# Patient Record
Sex: FEMALE | Race: WHITE | NOT HISPANIC OR LATINO
[De-identification: names, ages, dates, MRNs, and addresses within clinical notes are randomized per-mention and may not be internally consistent; named-entity substitution may affect disease eponyms.]

---

## 2021-06-14 ENCOUNTER — APPOINTMENT (OUTPATIENT)
Dept: OBGYN | Facility: CLINIC | Age: 66
End: 2021-06-14
Payer: MEDICARE

## 2021-06-14 VITALS
BODY MASS INDEX: 28.35 KG/M2 | TEMPERATURE: 97.3 F | DIASTOLIC BLOOD PRESSURE: 80 MMHG | WEIGHT: 160 LBS | SYSTOLIC BLOOD PRESSURE: 132 MMHG | HEIGHT: 63 IN

## 2021-06-14 DIAGNOSIS — U07.1 COVID-19: ICD-10-CM

## 2021-06-14 PROBLEM — Z00.00 ENCOUNTER FOR PREVENTIVE HEALTH EXAMINATION: Status: ACTIVE | Noted: 2021-06-14

## 2021-06-14 PROCEDURE — 99213 OFFICE O/P EST LOW 20 MIN: CPT

## 2021-06-16 LAB — HPV HIGH+LOW RISK DNA PNL CVX: NOT DETECTED

## 2021-06-23 LAB — CYTOLOGY CVX/VAG DOC THIN PREP: NORMAL

## 2022-06-20 ENCOUNTER — APPOINTMENT (OUTPATIENT)
Dept: OBGYN | Facility: CLINIC | Age: 67
End: 2022-06-20
Payer: MEDICARE

## 2022-06-20 ENCOUNTER — NON-APPOINTMENT (OUTPATIENT)
Age: 67
End: 2022-06-20

## 2022-06-20 VITALS
HEART RATE: 77 BPM | SYSTOLIC BLOOD PRESSURE: 144 MMHG | HEIGHT: 63 IN | DIASTOLIC BLOOD PRESSURE: 86 MMHG | BODY MASS INDEX: 28.35 KG/M2 | TEMPERATURE: 97.5 F | WEIGHT: 160 LBS

## 2022-06-20 LAB
BILIRUB UR QL STRIP: NEGATIVE
CLARITY UR: CLEAR
COLLECTION METHOD: NORMAL
GLUCOSE UR-MCNC: NEGATIVE
HCG UR QL: 0.2 EU/DL
HGB UR QL STRIP.AUTO: NORMAL
KETONES UR-MCNC: NEGATIVE
LEUKOCYTE ESTERASE UR QL STRIP: NORMAL
NITRITE UR QL STRIP: NEGATIVE
PH UR STRIP: 6
PROT UR STRIP-MCNC: NEGATIVE
SP GR UR STRIP: 1.01

## 2022-06-20 PROCEDURE — 81003 URINALYSIS AUTO W/O SCOPE: CPT | Mod: QW

## 2022-06-20 PROCEDURE — G0101: CPT

## 2022-06-26 LAB — HPV HIGH+LOW RISK DNA PNL CVX: NOT DETECTED

## 2022-06-29 LAB — CYTOLOGY CVX/VAG DOC THIN PREP: ABNORMAL

## 2023-06-26 ENCOUNTER — APPOINTMENT (OUTPATIENT)
Dept: OBGYN | Facility: CLINIC | Age: 68
End: 2023-06-26
Payer: MEDICARE

## 2023-06-26 VITALS — HEART RATE: 71 BPM | DIASTOLIC BLOOD PRESSURE: 86 MMHG | SYSTOLIC BLOOD PRESSURE: 147 MMHG | HEIGHT: 63 IN

## 2023-06-26 DIAGNOSIS — R39.89 OTHER SYMPTOMS AND SIGNS INVOLVING THE GENITOURINARY SYSTEM: ICD-10-CM

## 2023-06-26 LAB
BILIRUB UR QL STRIP: NEGATIVE
CLARITY UR: CLEAR
COLLECTION METHOD: NORMAL
GLUCOSE UR-MCNC: NEGATIVE
HCG UR QL: 0.2 EU/DL
HGB UR QL STRIP.AUTO: NORMAL
KETONES UR-MCNC: NEGATIVE
LEUKOCYTE ESTERASE UR QL STRIP: NORMAL
NITRITE UR QL STRIP: NEGATIVE
PH UR STRIP: 6.5
PROT UR STRIP-MCNC: NORMAL
SP GR UR STRIP: 1.01

## 2023-06-26 PROCEDURE — 99213 OFFICE O/P EST LOW 20 MIN: CPT

## 2023-06-26 PROCEDURE — 81003 URINALYSIS AUTO W/O SCOPE: CPT | Mod: QW

## 2023-07-02 LAB
BACTERIA UR CULT: NORMAL
CYTOLOGY CVX/VAG DOC THIN PREP: NORMAL
HPV HIGH+LOW RISK DNA PNL CVX: NOT DETECTED

## 2024-06-24 ENCOUNTER — APPOINTMENT (OUTPATIENT)
Dept: OBGYN | Facility: CLINIC | Age: 69
End: 2024-06-24
Payer: MEDICARE

## 2024-06-24 VITALS
BODY MASS INDEX: 29.23 KG/M2 | DIASTOLIC BLOOD PRESSURE: 80 MMHG | HEIGHT: 63 IN | HEART RATE: 67 BPM | SYSTOLIC BLOOD PRESSURE: 143 MMHG | WEIGHT: 165 LBS

## 2024-06-24 DIAGNOSIS — Z01.419 ENCOUNTER FOR GYNECOLOGICAL EXAMINATION (GENERAL) (ROUTINE) W/OUT ABNORMAL FINDINGS: ICD-10-CM

## 2024-06-24 DIAGNOSIS — R92.30 DENSE BREASTS, UNSPECIFIED: ICD-10-CM

## 2024-06-24 DIAGNOSIS — R31.29 OTHER MICROSCOPIC HEMATURIA: ICD-10-CM

## 2024-06-24 DIAGNOSIS — R87.610 ATYPICAL SQUAMOUS CELLS OF UNDETERMINED SIGNIFICANCE ON CYTOLOGIC SMEAR OF CERVIX (ASC-US): ICD-10-CM

## 2024-06-24 LAB
BILIRUB UR QL STRIP: NORMAL
CLARITY UR: CLEAR
COLLECTION METHOD: NORMAL
GLUCOSE UR-MCNC: NORMAL
HCG UR QL: 0.2 EU/DL
HGB UR QL STRIP.AUTO: ABNORMAL
KETONES UR-MCNC: NORMAL
LEUKOCYTE ESTERASE UR QL STRIP: NORMAL
NITRITE UR QL STRIP: NORMAL
PH UR STRIP: 6
PROT UR STRIP-MCNC: NORMAL
SP GR UR STRIP: 1.02

## 2024-06-24 PROCEDURE — 81003 URINALYSIS AUTO W/O SCOPE: CPT | Mod: QW

## 2024-06-24 PROCEDURE — G0101: CPT

## 2024-07-12 ENCOUNTER — APPOINTMENT (OUTPATIENT)
Dept: UROLOGY | Facility: CLINIC | Age: 69
End: 2024-07-12
Payer: MEDICARE

## 2024-07-12 DIAGNOSIS — R87.610 ATYPICAL SQUAMOUS CELLS OF UNDETERMINED SIGNIFICANCE ON CYTOLOGIC SMEAR OF CERVIX (ASC-US): ICD-10-CM

## 2024-07-12 DIAGNOSIS — Z80.9 FAMILY HISTORY OF MALIGNANT NEOPLASM, UNSPECIFIED: ICD-10-CM

## 2024-07-12 DIAGNOSIS — Z83.6 FAMILY HISTORY OF OTHER DISEASES OF THE RESPIRATORY SYSTEM: ICD-10-CM

## 2024-07-12 DIAGNOSIS — Z87.891 PERSONAL HISTORY OF NICOTINE DEPENDENCE: ICD-10-CM

## 2024-07-12 DIAGNOSIS — R31.29 OTHER MICROSCOPIC HEMATURIA: ICD-10-CM

## 2024-07-12 PROCEDURE — 99203 OFFICE O/P NEW LOW 30 MIN: CPT | Mod: 25

## 2024-07-12 PROCEDURE — 81003 URINALYSIS AUTO W/O SCOPE: CPT | Mod: QW

## 2024-07-14 PROBLEM — Z87.891 FORMER SMOKER: Status: ACTIVE | Noted: 2024-07-12

## 2024-07-14 LAB
BILIRUB UR QL STRIP: NORMAL
COLLECTION METHOD: NORMAL
GLUCOSE UR-MCNC: NORMAL
HCG UR QL: NORMAL EU/DL
KETONES UR-MCNC: NORMAL
LEUKOCYTE ESTERASE UR QL STRIP: NORMAL
NITRITE UR QL STRIP: NORMAL
PH UR STRIP: 6
PROT UR STRIP-MCNC: NORMAL
SP GR UR STRIP: 1.01

## 2024-07-14 RX ORDER — LEVOTHYROXINE SODIUM 0.17 MG/1
175 TABLET ORAL
Refills: 0 | Status: ACTIVE | COMMUNITY

## 2024-07-14 RX ORDER — OMEPRAZOLE 20 MG/1
20 TABLET, ORALLY DISINTEGRATING, DELAYED RELEASE ORAL
Refills: 0 | Status: ACTIVE | COMMUNITY

## 2024-07-14 RX ORDER — TELMISARTAN AND HYDROCHLOROTHIAZIDE 80; 12.5 MG/1; MG/1
80-12.5 TABLET ORAL
Refills: 0 | Status: ACTIVE | COMMUNITY

## 2024-07-31 ENCOUNTER — NON-APPOINTMENT (OUTPATIENT)
Age: 69
End: 2024-07-31

## 2024-08-06 ENCOUNTER — LABORATORY RESULT (OUTPATIENT)
Age: 69
End: 2024-08-06

## 2024-08-06 ENCOUNTER — APPOINTMENT (OUTPATIENT)
Dept: UROLOGY | Facility: CLINIC | Age: 69
End: 2024-08-06

## 2024-08-06 PROBLEM — N20.0 RIGHT RENAL STONE: Status: ACTIVE | Noted: 2024-08-06

## 2024-08-06 PROCEDURE — 81003 URINALYSIS AUTO W/O SCOPE: CPT | Mod: QW

## 2024-08-06 PROCEDURE — 99214 OFFICE O/P EST MOD 30 MIN: CPT | Mod: 25

## 2024-08-06 PROCEDURE — G2211 COMPLEX E/M VISIT ADD ON: CPT

## 2024-08-15 NOTE — ASSESSMENT
[FreeTextEntry1] :  69 year old female patient with microhematuria. We discussed obtaining a CT scan versus repeating ultrasound and KUB in 3 months. Pt would prefer to repeat the ultrasound. We will send her urine for microscopic urinalysis - if this comes back with more RBCs, we will consider a CT scan. Pt seen with JJ Ch. Total time=30 min.   The submitted E/M billing level for this visit reflects the total time spent on the day of the visit including face-to-face time spent with the patient, non-face-to-face review of medical records and relevant information, documentation, and asynchronous communication with the patient after a visit via phone, email, or patients EHR portal after the visit. The medical records reviewed are either scanned into the chart or reviewed with the patient using a patients electronic medical records portal for patients with records not available to [facility] via electronic transmission platforms from other institutions and labs. Time spent counseling and performing coordination of care was also included in determining the appropriate EM billing level.   I have reviewed and verified information regarding the chief complaint and history recorded by the ancillary staff and/or the patient. I have independently reviewed and interpreted tests performed by other physicians and facilities as necessary.   I have discussed with the patient differential diagnosis, reason for auxiliary tests if ordered, risks, benefits, alternatives, and complications of each form of therapy were discussed.  I personally performed the services described in the documentation, reviewed the documentation recorded by the scribe in my presence, and it accurately and completely records my words and actions.

## 2024-08-15 NOTE — HISTORY OF PRESENT ILLNESS
[FreeTextEntry1] :  69 year old female patient seen in follow-up for microhematuria. Her urinalysis in office today showed some blood and was sent for micro analysis to confirm. Her ultrasound was reviewed and results are as listed below. She complains of right sided back pain, only with movement. This pain is not consistent with renal colic. Denies any voiding issues, gross hematuria, or dysuria. Pt comes in today to discuss her ultrasound results.    Renal and bladder Ultrasound taken on 07/31/24:  4 mm stone within right kidney Mild dilation of both renal collecting system suggesting possible mild bilateral hydronephrosis, of uncertain etiology. Consider follow-up CT.  Small bilateral renal cysts.  BLADDER: Prevoid voume 343 cc, post void 19 cc. No bladder wall thickening. Left ureteral jet was visualized. Right ureteral jet not visualized.

## 2024-08-15 NOTE — ADDENDUM
[FreeTextEntry1] :  JUAN PABLO ALAMO, am scribing for and in the presence of  in the following sections: HISTORY OF PRESENT ILLNESS, PAST MEDICAL/FAMILY/SOCIAL HISTORY, REVIEW OF SYSTEMS, VITAL SIGNS, PHYSICAL EXAM, ASSESSMENT/PLAN on 08/06/2024.

## 2024-08-15 NOTE — ASSESSMENT
[FreeTextEntry1] :  69 year old female patient with microhematuria. We discussed obtaining a CT scan versus repeating ultrasound and KUB in 3 months. Pt would prefer to repeat the ultrasound. We will send her urine for microscopic urinalysis - if this comes back with more RBCs, we will consider a CT scan. Pt seen with JJ Ch. Total time=30 min.   The submitted E/M billing level for this visit reflects the total time spent on the day of the visit including face-to-face time spent with the patient, non-face-to-face review of medical records and relevant information, documentation, and asynchronous communication with the patient after a visit via phone, email, or patients EHR portal after the visit. The medical records reviewed are either scanned into the chart or reviewed with the patient using a patients electronic medical records portal for patients with records not available to [facility] via electronic transmission platforms from other institutions and labs. Time spent counseling and performing coordination of care was also included in determining the appropriate EM billing level.   I have reviewed and verified information regarding the chief complaint and history recorded by the ancillary staff and/or the patient. I have independently reviewed and interpreted tests performed by other physicians and facilities as necessary.   I have discussed with the patient differential diagnosis, reason for auxiliary tests if ordered, risks, benefits, alternatives, and complications of each form of therapy were discussed.  I personally performed the services described in the documentation, reviewed the documentation recorded by the scribe in my presence, and it accurately and completely records my words and actions.

## 2024-10-30 ENCOUNTER — NON-APPOINTMENT (OUTPATIENT)
Age: 69
End: 2024-10-30

## 2024-10-31 ENCOUNTER — NON-APPOINTMENT (OUTPATIENT)
Age: 69
End: 2024-10-31

## 2024-11-05 ENCOUNTER — APPOINTMENT (OUTPATIENT)
Dept: UROLOGY | Facility: CLINIC | Age: 69
End: 2024-11-05
Payer: MEDICARE

## 2024-11-05 ENCOUNTER — NON-APPOINTMENT (OUTPATIENT)
Age: 69
End: 2024-11-05

## 2024-11-05 VITALS
TEMPERATURE: 97.4 F | RESPIRATION RATE: 17 BRPM | WEIGHT: 165 LBS | HEART RATE: 66 BPM | BODY MASS INDEX: 29.23 KG/M2 | HEIGHT: 63 IN | DIASTOLIC BLOOD PRESSURE: 85 MMHG | SYSTOLIC BLOOD PRESSURE: 145 MMHG | OXYGEN SATURATION: 97 %

## 2024-11-05 DIAGNOSIS — R31.29 OTHER MICROSCOPIC HEMATURIA: ICD-10-CM

## 2024-11-05 DIAGNOSIS — N20.0 CALCULUS OF KIDNEY: ICD-10-CM

## 2024-11-05 PROCEDURE — 81003 URINALYSIS AUTO W/O SCOPE: CPT | Mod: QW

## 2024-11-05 PROCEDURE — 99214 OFFICE O/P EST MOD 30 MIN: CPT | Mod: 25

## 2024-11-05 PROCEDURE — G2211 COMPLEX E/M VISIT ADD ON: CPT

## 2024-11-10 LAB
BILIRUB UR QL STRIP: NORMAL
CLARITY UR: CLEAR
COLLECTION METHOD: NORMAL
GLUCOSE UR-MCNC: NORMAL
HCG UR QL: 0.2 EU/DL
HGB UR QL STRIP.AUTO: NORMAL
KETONES UR-MCNC: NORMAL
LEUKOCYTE ESTERASE UR QL STRIP: NORMAL
NITRITE UR QL STRIP: NORMAL
PH UR STRIP: 5.5
PROT UR STRIP-MCNC: NORMAL
SP GR UR STRIP: 1.01

## 2024-12-02 DIAGNOSIS — N64.89 OTHER SPECIFIED DISORDERS OF BREAST: ICD-10-CM

## 2024-12-09 ENCOUNTER — NON-APPOINTMENT (OUTPATIENT)
Age: 69
End: 2024-12-09

## 2024-12-09 DIAGNOSIS — N63.20 UNSPECIFIED LUMP IN THE LEFT BREAST, UNSPECIFIED QUADRANT: ICD-10-CM

## 2024-12-17 ENCOUNTER — NON-APPOINTMENT (OUTPATIENT)
Age: 69
End: 2024-12-17

## 2024-12-18 ENCOUNTER — APPOINTMENT (OUTPATIENT)
Dept: BREAST CENTER | Facility: CLINIC | Age: 69
End: 2024-12-18
Payer: MEDICARE

## 2024-12-18 ENCOUNTER — NON-APPOINTMENT (OUTPATIENT)
Age: 69
End: 2024-12-18

## 2024-12-18 ENCOUNTER — APPOINTMENT (OUTPATIENT)
Dept: BREAST CENTER | Facility: CLINIC | Age: 69
End: 2024-12-18

## 2024-12-18 VITALS
SYSTOLIC BLOOD PRESSURE: 148 MMHG | BODY MASS INDEX: 29.23 KG/M2 | WEIGHT: 165 LBS | DIASTOLIC BLOOD PRESSURE: 100 MMHG | HEART RATE: 72 BPM | HEIGHT: 63 IN

## 2024-12-18 DIAGNOSIS — C50.412 MALIGNANT NEOPLASM OF UPPER-OUTER QUADRANT OF LEFT FEMALE BREAST: ICD-10-CM

## 2024-12-18 PROCEDURE — 99205 OFFICE O/P NEW HI 60 MIN: CPT

## 2024-12-24 ENCOUNTER — NON-APPOINTMENT (OUTPATIENT)
Age: 69
End: 2024-12-24

## 2024-12-24 ENCOUNTER — APPOINTMENT (OUTPATIENT)
Dept: HEMATOLOGY ONCOLOGY | Facility: CLINIC | Age: 69
End: 2024-12-24

## 2025-01-09 ENCOUNTER — NON-APPOINTMENT (OUTPATIENT)
Age: 70
End: 2025-01-09

## 2025-01-14 ENCOUNTER — OUTPATIENT (OUTPATIENT)
Dept: OUTPATIENT SERVICES | Facility: HOSPITAL | Age: 70
LOS: 1 days | End: 2025-01-14
Payer: MEDICARE

## 2025-01-14 ENCOUNTER — RESULT REVIEW (OUTPATIENT)
Age: 70
End: 2025-01-14

## 2025-01-14 DIAGNOSIS — Z85.3 PERSONAL HISTORY OF MALIGNANT NEOPLASM OF BREAST: ICD-10-CM

## 2025-01-14 DIAGNOSIS — C50.412 MALIGNANT NEOPLASM OF UPPER-OUTER QUADRANT OF LEFT FEMALE BREAST: ICD-10-CM

## 2025-01-14 PROCEDURE — 77049 MRI BREAST C-+ W/CAD BI: CPT | Mod: 26

## 2025-01-14 PROCEDURE — C8937: CPT

## 2025-01-14 PROCEDURE — A9579: CPT

## 2025-01-14 PROCEDURE — 77049 MRI BREAST C-+ W/CAD BI: CPT

## 2025-01-15 ENCOUNTER — NON-APPOINTMENT (OUTPATIENT)
Age: 70
End: 2025-01-15

## 2025-01-15 DIAGNOSIS — C50.412 MALIGNANT NEOPLASM OF UPPER-OUTER QUADRANT OF LEFT FEMALE BREAST: ICD-10-CM

## 2025-01-17 ENCOUNTER — LABORATORY RESULT (OUTPATIENT)
Age: 70
End: 2025-01-17

## 2025-01-17 ENCOUNTER — OUTPATIENT (OUTPATIENT)
Dept: OUTPATIENT SERVICES | Facility: HOSPITAL | Age: 70
LOS: 1 days | End: 2025-01-17
Payer: MEDICARE

## 2025-01-17 VITALS
RESPIRATION RATE: 16 BRPM | DIASTOLIC BLOOD PRESSURE: 98 MMHG | HEART RATE: 66 BPM | HEIGHT: 61 IN | OXYGEN SATURATION: 98 % | TEMPERATURE: 98 F | WEIGHT: 160.06 LBS | SYSTOLIC BLOOD PRESSURE: 144 MMHG

## 2025-01-17 DIAGNOSIS — Z01.818 ENCOUNTER FOR OTHER PREPROCEDURAL EXAMINATION: ICD-10-CM

## 2025-01-17 DIAGNOSIS — C50.412 MALIGNANT NEOPLASM OF UPPER-OUTER QUADRANT OF LEFT FEMALE BREAST: ICD-10-CM

## 2025-01-17 DIAGNOSIS — Z98.890 OTHER SPECIFIED POSTPROCEDURAL STATES: Chronic | ICD-10-CM

## 2025-01-17 DIAGNOSIS — I10 ESSENTIAL (PRIMARY) HYPERTENSION: Chronic | ICD-10-CM

## 2025-01-17 LAB
ALBUMIN SERPL ELPH-MCNC: 4.2 G/DL — SIGNIFICANT CHANGE UP (ref 3.5–5.2)
ALP SERPL-CCNC: 97 U/L — SIGNIFICANT CHANGE UP (ref 30–115)
ALT FLD-CCNC: 12 U/L — SIGNIFICANT CHANGE UP (ref 0–41)
ANION GAP SERPL CALC-SCNC: 12 MMOL/L — SIGNIFICANT CHANGE UP (ref 7–14)
AST SERPL-CCNC: 10 U/L — SIGNIFICANT CHANGE UP (ref 0–41)
BASOPHILS # BLD AUTO: 0.08 K/UL — SIGNIFICANT CHANGE UP (ref 0–0.2)
BASOPHILS NFR BLD AUTO: 1.1 % — HIGH (ref 0–1)
BILIRUB SERPL-MCNC: 0.4 MG/DL — SIGNIFICANT CHANGE UP (ref 0.2–1.2)
BUN SERPL-MCNC: 14 MG/DL — SIGNIFICANT CHANGE UP (ref 10–20)
CALCIUM SERPL-MCNC: 9.4 MG/DL — SIGNIFICANT CHANGE UP (ref 8.4–10.5)
CHLORIDE SERPL-SCNC: 105 MMOL/L — SIGNIFICANT CHANGE UP (ref 98–110)
CO2 SERPL-SCNC: 26 MMOL/L — SIGNIFICANT CHANGE UP (ref 17–32)
CREAT SERPL-MCNC: 0.8 MG/DL — SIGNIFICANT CHANGE UP (ref 0.7–1.5)
EGFR: 79 ML/MIN/1.73M2 — SIGNIFICANT CHANGE UP
EOSINOPHIL # BLD AUTO: 0.29 K/UL — SIGNIFICANT CHANGE UP (ref 0–0.7)
EOSINOPHIL NFR BLD AUTO: 3.8 % — SIGNIFICANT CHANGE UP (ref 0–8)
GLUCOSE SERPL-MCNC: 93 MG/DL — SIGNIFICANT CHANGE UP (ref 70–99)
HCT VFR BLD CALC: 42.2 % — SIGNIFICANT CHANGE UP (ref 37–47)
HGB BLD-MCNC: 13.3 G/DL — SIGNIFICANT CHANGE UP (ref 12–16)
IMM GRANULOCYTES NFR BLD AUTO: 0.4 % — HIGH (ref 0.1–0.3)
LYMPHOCYTES # BLD AUTO: 2.31 K/UL — SIGNIFICANT CHANGE UP (ref 1.2–3.4)
LYMPHOCYTES # BLD AUTO: 30.5 % — SIGNIFICANT CHANGE UP (ref 20.5–51.1)
MCHC RBC-ENTMCNC: 28.7 PG — SIGNIFICANT CHANGE UP (ref 27–31)
MCHC RBC-ENTMCNC: 31.5 G/DL — LOW (ref 32–37)
MCV RBC AUTO: 90.9 FL — SIGNIFICANT CHANGE UP (ref 81–99)
MONOCYTES # BLD AUTO: 0.55 K/UL — SIGNIFICANT CHANGE UP (ref 0.1–0.6)
MONOCYTES NFR BLD AUTO: 7.3 % — SIGNIFICANT CHANGE UP (ref 1.7–9.3)
NEUTROPHILS # BLD AUTO: 4.31 K/UL — SIGNIFICANT CHANGE UP (ref 1.4–6.5)
NEUTROPHILS NFR BLD AUTO: 56.9 % — SIGNIFICANT CHANGE UP (ref 42.2–75.2)
NRBC # BLD: 0 /100 WBCS — SIGNIFICANT CHANGE UP (ref 0–0)
PLATELET # BLD AUTO: 278 K/UL — SIGNIFICANT CHANGE UP (ref 130–400)
PMV BLD: 9.9 FL — SIGNIFICANT CHANGE UP (ref 7.4–10.4)
POTASSIUM SERPL-MCNC: 4.2 MMOL/L — SIGNIFICANT CHANGE UP (ref 3.5–5)
POTASSIUM SERPL-SCNC: 4.2 MMOL/L — SIGNIFICANT CHANGE UP (ref 3.5–5)
PROT SERPL-MCNC: 6.4 G/DL — SIGNIFICANT CHANGE UP (ref 6–8)
RBC # BLD: 4.64 M/UL — SIGNIFICANT CHANGE UP (ref 4.2–5.4)
RBC # FLD: 13.4 % — SIGNIFICANT CHANGE UP (ref 11.5–14.5)
SODIUM SERPL-SCNC: 143 MMOL/L — SIGNIFICANT CHANGE UP (ref 135–146)
WBC # BLD: 7.57 K/UL — SIGNIFICANT CHANGE UP (ref 4.8–10.8)
WBC # FLD AUTO: 7.57 K/UL — SIGNIFICANT CHANGE UP (ref 4.8–10.8)

## 2025-01-17 PROCEDURE — 93010 ELECTROCARDIOGRAM REPORT: CPT

## 2025-01-17 PROCEDURE — 99214 OFFICE O/P EST MOD 30 MIN: CPT | Mod: 25

## 2025-01-17 PROCEDURE — 85025 COMPLETE CBC W/AUTO DIFF WBC: CPT

## 2025-01-17 PROCEDURE — 80053 COMPREHEN METABOLIC PANEL: CPT

## 2025-01-17 PROCEDURE — 36415 COLL VENOUS BLD VENIPUNCTURE: CPT

## 2025-01-17 PROCEDURE — 93005 ELECTROCARDIOGRAM TRACING: CPT

## 2025-01-17 NOTE — H&P PST ADULT - HISTORY OF PRESENT ILLNESS
PATIENT CURRENTLY DENIES CHEST PAIN  SHORTNESS OF BREATH  PALPITATIONS,  DYSURIA, OR UPPER RESPIRATORY INFECTION IN PAST 2 WEEKS  PATIENT REPORTS BREAST CA AFTER HER ROUTINE MAMMOGRAM, WORK UP WAS DONE INCLUDING BIOPSY, SURGERY ABOVE SCHEDULLED    Denies travel outside the [place] in the past 30 days  Patient denies any signs or symptoms of COVID 19 and denies contact with known positive individuals.         Anesthesia Alert  YES--Difficult Airway  CLASS IV  NO--History of neck surgery or radiation  NO--Limited ROM of neck  NO--History of Malignant hyperthermia  NO--No personal or family history of Pseudocholinesterase deficiency.  NO--Prior Anesthesia Complication  NO--Latex Allergy  NO--Loose teeth  NO--History of Rheumatoid Arthritis  NO--Bleeding risk  NO--KRISTINE  NO--Other_____    DASI  8.97    RCRI 0    PT DENIES ANY RASHES, ABRASION, OR OPEN WOUNDS OR CUTS    AS PER THE PT, THIS IS HIS/HER COMPLETE MEDICAL AND SURGICAL HX, INCLUDING MEDICATIONS PRESCRIBED AND OVER THE COUNTER    Patient/Guardian understands the instructions and was given the opportunity to ask questions and have them answered.    pt denies any suicidal ideation or thoughts, pt states not a threat to self or others

## 2025-01-17 NOTE — H&P PST ADULT - REASON FOR ADMISSION
Case Type: OP Block TimeSuite: CASProceduralist: Suma Chambers  Confirmed Surgery DateTime: 01- - Procedure: left breast bracketed lumpectomy with left breast bracketed needle localization  ERP?: NoLaterality: N/ALength of Procedure: 60 Minutes  Anesthesia Type: Local Standby Case Type: OP Block TimeSuite: CASProceduralist: Suma Chambers  Confirmed Surgery DateTime: 01- - Procedure: left breast bracketed lumpectomy with left breast bracketed needle localization, tissue transfer  ERP?: NoLaterality: N/ALength of Procedure: 60 Minutes  Anesthesia Type: Local Standby

## 2025-01-17 NOTE — H&P PST ADULT - NSANTHOSAYNRD_GEN_A_CORE
Albumin 3.0  c/w Ensure supplements Albumin 3.0  c/w Ensure supplements Continue synthroid Continue synthroid. Continue synthroid. Continue synthroid. Continue with SQ Lovenox Continue with SQ Lovenox Continue with SQ Lovenox Continue with SQ Lovenox Continue with SQ Lovenox Continue with SQ Lovenox Continue with SQ Lovenox c/w lovenox for DVT c/w lovenox for DVT proph -Patient has hx of dementia, not on any medications. -Patient has hx of dementia, not on any medications. -Patient has hx of dementia, not on any medications. -Patient has hx of dementia, not on any medications. Continue synthroid Continue synthroid. Albumin 3.0  c/w Ensure supplements No. KRISTINE screening performed.  STOP BANG Legend: 0-2 = LOW Risk; 3-4 = INTERMEDIATE Risk; 5-8 = HIGH Risk

## 2025-01-18 DIAGNOSIS — Z01.818 ENCOUNTER FOR OTHER PREPROCEDURAL EXAMINATION: ICD-10-CM

## 2025-01-18 DIAGNOSIS — C50.412 MALIGNANT NEOPLASM OF UPPER-OUTER QUADRANT OF LEFT FEMALE BREAST: ICD-10-CM

## 2025-01-21 ENCOUNTER — OUTPATIENT (OUTPATIENT)
Dept: OUTPATIENT SERVICES | Facility: HOSPITAL | Age: 70
LOS: 1 days | End: 2025-01-21

## 2025-01-21 DIAGNOSIS — Z00.8 ENCOUNTER FOR OTHER GENERAL EXAMINATION: ICD-10-CM

## 2025-01-21 DIAGNOSIS — Z98.890 OTHER SPECIFIED POSTPROCEDURAL STATES: Chronic | ICD-10-CM

## 2025-01-21 PROBLEM — I10 ESSENTIAL (PRIMARY) HYPERTENSION: Chronic | Status: ACTIVE | Noted: 2025-01-17

## 2025-01-21 PROBLEM — E03.9 HYPOTHYROIDISM, UNSPECIFIED: Chronic | Status: ACTIVE | Noted: 2025-01-17

## 2025-01-22 DIAGNOSIS — Z00.8 ENCOUNTER FOR OTHER GENERAL EXAMINATION: ICD-10-CM

## 2025-01-23 ENCOUNTER — OUTPATIENT (OUTPATIENT)
Dept: OUTPATIENT SERVICES | Facility: HOSPITAL | Age: 70
LOS: 1 days | End: 2025-01-23
Payer: MEDICARE

## 2025-01-23 ENCOUNTER — RESULT REVIEW (OUTPATIENT)
Age: 70
End: 2025-01-23

## 2025-01-23 DIAGNOSIS — R92.8 OTHER ABNORMAL AND INCONCLUSIVE FINDINGS ON DIAGNOSTIC IMAGING OF BREAST: ICD-10-CM

## 2025-01-23 DIAGNOSIS — Z98.890 OTHER SPECIFIED POSTPROCEDURAL STATES: Chronic | ICD-10-CM

## 2025-01-23 PROCEDURE — A9579: CPT

## 2025-01-23 PROCEDURE — 77065 DX MAMMO INCL CAD UNI: CPT | Mod: 26,LT

## 2025-01-23 PROCEDURE — A4648: CPT

## 2025-01-23 PROCEDURE — 19288 PERQ DEV BREAST ADD MR GUIDE: CPT | Mod: LT

## 2025-01-23 PROCEDURE — 19287 PERQ DEV BREAST 1ST MR GUIDE: CPT | Mod: LT

## 2025-01-23 PROCEDURE — 77065 DX MAMMO INCL CAD UNI: CPT | Mod: LT

## 2025-01-24 DIAGNOSIS — R92.8 OTHER ABNORMAL AND INCONCLUSIVE FINDINGS ON DIAGNOSTIC IMAGING OF BREAST: ICD-10-CM

## 2025-01-28 ENCOUNTER — APPOINTMENT (OUTPATIENT)
Dept: BREAST CENTER | Facility: AMBULATORY SURGERY CENTER | Age: 70
End: 2025-01-28
Payer: MEDICARE

## 2025-01-28 ENCOUNTER — OUTPATIENT (OUTPATIENT)
Dept: OUTPATIENT SERVICES | Facility: HOSPITAL | Age: 70
LOS: 1 days | Discharge: HOME | End: 2025-01-28
Payer: MEDICARE

## 2025-01-28 ENCOUNTER — RESULT REVIEW (OUTPATIENT)
Age: 70
End: 2025-01-28

## 2025-01-28 VITALS
HEART RATE: 61 BPM | SYSTOLIC BLOOD PRESSURE: 126 MMHG | DIASTOLIC BLOOD PRESSURE: 73 MMHG | OXYGEN SATURATION: 95 % | RESPIRATION RATE: 20 BRPM

## 2025-01-28 VITALS
DIASTOLIC BLOOD PRESSURE: 89 MMHG | HEART RATE: 62 BPM | HEIGHT: 61 IN | RESPIRATION RATE: 17 BRPM | OXYGEN SATURATION: 96 % | TEMPERATURE: 98 F | SYSTOLIC BLOOD PRESSURE: 159 MMHG | WEIGHT: 160.06 LBS

## 2025-01-28 DIAGNOSIS — C50.412 MALIGNANT NEOPLASM OF UPPER-OUTER QUADRANT OF LEFT FEMALE BREAST: ICD-10-CM

## 2025-01-28 DIAGNOSIS — Z98.890 OTHER SPECIFIED POSTPROCEDURAL STATES: Chronic | ICD-10-CM

## 2025-01-28 PROCEDURE — 19301 PARTIAL MASTECTOMY: CPT | Mod: LT

## 2025-01-28 PROCEDURE — 14001 TIS TRNFR TRUNK 10.1-30SQCM: CPT

## 2025-01-28 PROCEDURE — 19281 PERQ DEVICE BREAST 1ST IMAG: CPT | Mod: LT

## 2025-01-28 PROCEDURE — 19282 PERQ DEVICE BREAST EA IMAG: CPT | Mod: LT

## 2025-01-28 PROCEDURE — 88305 TISSUE EXAM BY PATHOLOGIST: CPT | Mod: 26

## 2025-01-28 PROCEDURE — C1889: CPT

## 2025-01-28 PROCEDURE — 88305 TISSUE EXAM BY PATHOLOGIST: CPT

## 2025-01-28 RX ORDER — LEVOTHYROXINE SODIUM 25 UG/1
1 TABLET ORAL
Refills: 0 | DISCHARGE

## 2025-01-28 RX ORDER — HYDROMORPHONE HYDROCHLORIDE 4 MG/ML
0.5 INJECTION, SOLUTION INTRAMUSCULAR; INTRAVENOUS; SUBCUTANEOUS
Refills: 0 | Status: DISCONTINUED | OUTPATIENT
Start: 2025-01-28 | End: 2025-01-28

## 2025-01-28 RX ORDER — TELMISARTAN 40 MG/1
1 TABLET ORAL
Refills: 0 | DISCHARGE

## 2025-01-28 RX ORDER — OXYCODONE HYDROCHLORIDE 30 MG/1
5 TABLET ORAL ONCE
Refills: 0 | Status: DISCONTINUED | OUTPATIENT
Start: 2025-01-28 | End: 2025-01-28

## 2025-01-28 RX ORDER — SODIUM CHLORIDE 9 G/ML
1000 INJECTION, SOLUTION INTRAVENOUS
Refills: 0 | Status: DISCONTINUED | OUTPATIENT
Start: 2025-01-28 | End: 2025-01-28

## 2025-01-28 RX ORDER — ACETAMINOPHEN 160 MG/5ML
1000 SUSPENSION ORAL ONCE
Refills: 0 | Status: COMPLETED | OUTPATIENT
Start: 2025-01-28 | End: 2025-01-28

## 2025-01-28 RX ORDER — ONDANSETRON 4 MG/1
4 TABLET, ORALLY DISINTEGRATING ORAL ONCE
Refills: 0 | Status: DISCONTINUED | OUTPATIENT
Start: 2025-01-28 | End: 2025-01-28

## 2025-01-28 RX ADMIN — ACETAMINOPHEN 1000 MILLIGRAM(S): 160 SUSPENSION ORAL at 14:42

## 2025-01-28 RX ADMIN — SODIUM CHLORIDE 100 MILLILITER(S): 9 INJECTION, SOLUTION INTRAVENOUS at 14:25

## 2025-01-28 NOTE — CHART NOTE - NSCHARTNOTEFT_GEN_A_CORE
PACU ANESTHESIA ADMISSION NOTE      Procedure: left breast lumpectomy    ____  Intubated  TV:______       Rate: ______      FiO2: ______    ___x_  Patent Airway    __x__  Full return of protective reflexes    __x__  Full recovery from anesthesia / back to baseline     Vitals:   T: 97.7        R: 18             BP:  124/69         Sat: 98                 P: 63      Mental Status:  ___x_ Awake   _____ Alert   _____ Drowsy   _____ Sedated    Nausea/Vomiting:  __x__ NO  ______Yes,   See Post - Op Orders          Pain Scale (0-10):  _0____    Treatment: ____ None    ____ See Post - Op/PCA Orders    Post - Operative Fluids:   ____ Oral   ___x_ See Post - Op Orders    Plan: Discharge:   __x__Home       _____Floor     _____Critical Care    _____  Other:_________________    Comments:

## 2025-01-28 NOTE — ASU DISCHARGE PLAN (ADULT/PEDIATRIC) - ASU DC SPECIAL INSTRUCTIONSFT
SURGERY DISCHARGE INSTRUCTIONS    FOLLOW-UP - with Dr. Chambers in 2 weeks. Call the office to make an appointment or if you have any questions/concerns.    DIET - regular.     ACTIVITY- No heavy lifting for 2 wks over 10-20 lbs. Walking is encouraged. No running or swimming. No driving while taking pain medication.    WOUNDCARE - Some drainage from your incisions or drain sites is normal. No submerging wound under water for 2 weeks (tub bathing). Pat area dry when wet, keep clean and dry. Do not apply powders or lotion to wound area.     PAIN MEDS - Take prescription pain meds as instructed but only if needed as they can be addicting. Take over the counter extra strength tylenol 650mg and/or ibuprofen 400mg with food every 6 hours for pain.      OTHER MEDS - If you have any questions about your other regular home medications please call your primary care physician or the physician who prescribed those medications to you.     If you develop fever, dizziness, chest pain, trouble breathing, nausea, vomiting, redness/pain/discharge from incisions. Please call the office or go to the emergency room immediately.

## 2025-01-28 NOTE — ASU DISCHARGE PLAN (ADULT/PEDIATRIC) - CARE PROVIDER_API CALL
Suma Chambers  Surgery  [address] [place] [address], [address] [place] [address]  Phone: [phone]  Fax: [phone]  Established Patient  Follow Up Time: 2 weeks

## 2025-01-28 NOTE — ASU DISCHARGE PLAN (ADULT/PEDIATRIC) - FINANCIAL ASSISTANCE
[facility] provides services at a reduced cost to those who are determined to be eligible through [facility]’s financial assistance program. Information regarding [facility]’s financial assistance program can be found by going to [email or web address] or by calling [phone].

## 2025-01-28 NOTE — ASU PREOP CHECKLIST - BP NONINVASIVE SYSTOLIC (MM HG)
159
Follow up with your Dr. Carranza cardiologist on Tuesday, July 25th at 2:30PM at the [facility] cardiology clinic and your primary care provider in 1-2 weeks.    If you need to reschedule your appointment please call Dr. Carranza's office at [phone].

## 2025-01-28 NOTE — ASU DISCHARGE PLAN (ADULT/PEDIATRIC) - NS MD DC FALL RISK RISK
For information on Fall & Injury Prevention, visit: [email or web address] OR  [email or web address] OR  https://www.cdc.gov/steadi/patient.html

## 2025-01-29 ENCOUNTER — NON-APPOINTMENT (OUTPATIENT)
Age: 70
End: 2025-01-29

## 2025-01-29 PROBLEM — U07.1 COVID-19: Chronic | Status: ACTIVE | Noted: 2025-01-27

## 2025-01-31 ENCOUNTER — NON-APPOINTMENT (OUTPATIENT)
Age: 70
End: 2025-01-31

## 2025-01-31 RX ORDER — SILVER SULFADIAZINE 10 MG/G
1 CREAM TOPICAL TWICE DAILY
Qty: 1 | Refills: 0 | Status: ACTIVE | COMMUNITY
Start: 2025-01-31 | End: 1900-01-01

## 2025-01-31 RX ORDER — NITROGLYCERIN 20 MG/G
2 OINTMENT TOPICAL
Qty: 1 | Refills: 0 | Status: ACTIVE | COMMUNITY
Start: 2025-01-31 | End: 1900-01-01

## 2025-02-03 LAB — SURGICAL PATHOLOGY STUDY: SIGNIFICANT CHANGE UP

## 2025-02-04 DIAGNOSIS — N60.22 FIBROADENOSIS OF LEFT BREAST: ICD-10-CM

## 2025-02-04 DIAGNOSIS — N60.82 OTHER BENIGN MAMMARY DYSPLASIAS OF LEFT BREAST: ICD-10-CM

## 2025-02-04 DIAGNOSIS — C50.912 MALIGNANT NEOPLASM OF UNSPECIFIED SITE OF LEFT FEMALE BREAST: ICD-10-CM

## 2025-02-04 DIAGNOSIS — N60.32 FIBROSCLEROSIS OF LEFT BREAST: ICD-10-CM

## 2025-02-06 ENCOUNTER — APPOINTMENT (OUTPATIENT)
Dept: BREAST CENTER | Facility: CLINIC | Age: 70
End: 2025-02-06

## 2025-02-06 ENCOUNTER — NON-APPOINTMENT (OUTPATIENT)
Age: 70
End: 2025-02-06

## 2025-02-06 VITALS
HEART RATE: 70 BPM | HEIGHT: 63 IN | BODY MASS INDEX: 29.23 KG/M2 | SYSTOLIC BLOOD PRESSURE: 146 MMHG | WEIGHT: 165 LBS | DIASTOLIC BLOOD PRESSURE: 93 MMHG

## 2025-02-06 PROCEDURE — 99024 POSTOP FOLLOW-UP VISIT: CPT

## 2025-02-11 ENCOUNTER — APPOINTMENT (OUTPATIENT)
Age: 70
End: 2025-02-11
Payer: MEDICARE

## 2025-02-11 ENCOUNTER — OUTPATIENT (OUTPATIENT)
Dept: OUTPATIENT SERVICES | Facility: HOSPITAL | Age: 70
LOS: 1 days | End: 2025-02-11
Payer: MEDICARE

## 2025-02-11 VITALS
HEART RATE: 64 BPM | SYSTOLIC BLOOD PRESSURE: 154 MMHG | HEIGHT: 63 IN | BODY MASS INDEX: 29.23 KG/M2 | DIASTOLIC BLOOD PRESSURE: 89 MMHG | TEMPERATURE: 98.6 F | WEIGHT: 165 LBS | RESPIRATION RATE: 16 BRPM

## 2025-02-11 DIAGNOSIS — C50.412 MALIGNANT NEOPLASM OF UPPER-OUTER QUADRANT OF LEFT FEMALE BREAST: ICD-10-CM

## 2025-02-11 DIAGNOSIS — Z98.890 OTHER SPECIFIED POSTPROCEDURAL STATES: Chronic | ICD-10-CM

## 2025-02-11 DIAGNOSIS — Z86.39 PERSONAL HISTORY OF OTHER ENDOCRINE, NUTRITIONAL AND METABOLIC DISEASE: ICD-10-CM

## 2025-02-11 DIAGNOSIS — Z79.811 LONG TERM (CURRENT) USE OF AROMATASE INHIBITORS: ICD-10-CM

## 2025-02-11 PROCEDURE — 99205 OFFICE O/P NEW HI 60 MIN: CPT

## 2025-02-11 RX ORDER — ANASTROZOLE TABLETS 1 MG/1
1 TABLET ORAL
Qty: 90 | Refills: 2 | Status: ACTIVE | COMMUNITY
Start: 2025-02-11 | End: 1900-01-01

## 2025-02-12 ENCOUNTER — NON-APPOINTMENT (OUTPATIENT)
Age: 70
End: 2025-02-12

## 2025-02-12 ENCOUNTER — APPOINTMENT (OUTPATIENT)
Dept: RADIATION ONCOLOGY | Facility: HOSPITAL | Age: 70
End: 2025-02-12
Payer: MEDICARE

## 2025-02-12 ENCOUNTER — OUTPATIENT (OUTPATIENT)
Dept: OUTPATIENT SERVICES | Facility: HOSPITAL | Age: 70
LOS: 1 days | End: 2025-02-12
Payer: MEDICARE

## 2025-02-12 VITALS
RESPIRATION RATE: 16 BRPM | HEIGHT: 63 IN | HEART RATE: 81 BPM | WEIGHT: 166.25 LBS | BODY MASS INDEX: 29.46 KG/M2 | TEMPERATURE: 98.1 F | SYSTOLIC BLOOD PRESSURE: 121 MMHG | OXYGEN SATURATION: 96 % | DIASTOLIC BLOOD PRESSURE: 79 MMHG

## 2025-02-12 DIAGNOSIS — Z98.890 OTHER SPECIFIED POSTPROCEDURAL STATES: Chronic | ICD-10-CM

## 2025-02-12 DIAGNOSIS — C50.412 MALIGNANT NEOPLASM OF UPPER-OUTER QUADRANT OF LEFT FEMALE BREAST: ICD-10-CM

## 2025-02-12 PROCEDURE — 99204 OFFICE O/P NEW MOD 45 MIN: CPT

## 2025-02-13 ENCOUNTER — NON-APPOINTMENT (OUTPATIENT)
Age: 70
End: 2025-02-13

## 2025-02-16 PROBLEM — Z79.811 USE OF AROMATASE INHIBITORS: Status: ACTIVE | Noted: 2025-02-16

## 2025-02-16 PROBLEM — Z86.39 HISTORY OF HYPOTHYROIDISM: Status: RESOLVED | Noted: 2025-02-11 | Resolved: 2025-02-16

## 2025-02-18 ENCOUNTER — RESULT REVIEW (OUTPATIENT)
Age: 70
End: 2025-02-18

## 2025-02-18 ENCOUNTER — OUTPATIENT (OUTPATIENT)
Dept: OUTPATIENT SERVICES | Facility: HOSPITAL | Age: 70
LOS: 1 days | End: 2025-02-18
Payer: MEDICARE

## 2025-02-18 DIAGNOSIS — Z13.820 ENCOUNTER FOR SCREENING FOR OSTEOPOROSIS: ICD-10-CM

## 2025-02-18 DIAGNOSIS — Z00.8 ENCOUNTER FOR OTHER GENERAL EXAMINATION: ICD-10-CM

## 2025-02-18 DIAGNOSIS — Z98.890 OTHER SPECIFIED POSTPROCEDURAL STATES: Chronic | ICD-10-CM

## 2025-02-18 DIAGNOSIS — C50.412 MALIGNANT NEOPLASM OF UPPER-OUTER QUADRANT OF LEFT FEMALE BREAST: ICD-10-CM

## 2025-02-18 PROCEDURE — 77290 THER RAD SIMULAJ FIELD CPLX: CPT

## 2025-02-18 PROCEDURE — 77290 THER RAD SIMULAJ FIELD CPLX: CPT | Mod: 26

## 2025-02-18 PROCEDURE — 77080 DXA BONE DENSITY AXIAL: CPT | Mod: 26

## 2025-02-18 PROCEDURE — 77333 RADIATION TREATMENT AID(S): CPT

## 2025-02-18 PROCEDURE — 77333 RADIATION TREATMENT AID(S): CPT | Mod: 26

## 2025-02-18 PROCEDURE — 77263 THER RADIOLOGY TX PLNG CPLX: CPT

## 2025-02-18 PROCEDURE — 77080 DXA BONE DENSITY AXIAL: CPT

## 2025-02-25 ENCOUNTER — OUTPATIENT (OUTPATIENT)
Dept: OUTPATIENT SERVICES | Facility: HOSPITAL | Age: 70
LOS: 1 days | End: 2025-02-25

## 2025-02-25 DIAGNOSIS — C50.412 MALIGNANT NEOPLASM OF UPPER-OUTER QUADRANT OF LEFT FEMALE BREAST: ICD-10-CM

## 2025-02-25 DIAGNOSIS — Z98.890 OTHER SPECIFIED POSTPROCEDURAL STATES: Chronic | ICD-10-CM

## 2025-02-25 PROCEDURE — 77300 RADIATION THERAPY DOSE PLAN: CPT | Mod: 26

## 2025-02-25 PROCEDURE — 77295 3-D RADIOTHERAPY PLAN: CPT | Mod: 26

## 2025-02-25 PROCEDURE — 77334 RADIATION TREATMENT AID(S): CPT | Mod: 26

## 2025-02-28 ENCOUNTER — OUTPATIENT (OUTPATIENT)
Dept: OUTPATIENT SERVICES | Facility: HOSPITAL | Age: 70
LOS: 1 days | End: 2025-02-28
Payer: MEDICARE

## 2025-02-28 DIAGNOSIS — Z98.890 OTHER SPECIFIED POSTPROCEDURAL STATES: Chronic | ICD-10-CM

## 2025-02-28 DIAGNOSIS — C50.412 MALIGNANT NEOPLASM OF UPPER-OUTER QUADRANT OF LEFT FEMALE BREAST: ICD-10-CM

## 2025-02-28 PROCEDURE — 77333 RADIATION TREATMENT AID(S): CPT | Mod: 26

## 2025-02-28 PROCEDURE — 77280 THER RAD SIMULAJ FIELD SMPL: CPT | Mod: 26

## 2025-03-03 ENCOUNTER — OUTPATIENT (OUTPATIENT)
Dept: OUTPATIENT SERVICES | Facility: HOSPITAL | Age: 70
LOS: 1 days | End: 2025-03-03
Payer: MEDICARE

## 2025-03-03 DIAGNOSIS — Z98.890 OTHER SPECIFIED POSTPROCEDURAL STATES: Chronic | ICD-10-CM

## 2025-03-03 DIAGNOSIS — C50.412 MALIGNANT NEOPLASM OF UPPER-OUTER QUADRANT OF LEFT FEMALE BREAST: ICD-10-CM

## 2025-03-03 PROCEDURE — 77412 RADIATION TX DELIVERY LVL 3: CPT

## 2025-03-03 PROCEDURE — 77417 THER RADIOLOGY PORT IMAGE(S): CPT

## 2025-03-03 PROCEDURE — 77427 RADIATION TX MANAGEMENT X5: CPT

## 2025-03-04 ENCOUNTER — OUTPATIENT (OUTPATIENT)
Dept: OUTPATIENT SERVICES | Facility: HOSPITAL | Age: 70
LOS: 1 days | End: 2025-03-04

## 2025-03-04 ENCOUNTER — NON-APPOINTMENT (OUTPATIENT)
Age: 70
End: 2025-03-04

## 2025-03-04 VITALS
DIASTOLIC BLOOD PRESSURE: 81 MMHG | SYSTOLIC BLOOD PRESSURE: 152 MMHG | RESPIRATION RATE: 17 BRPM | TEMPERATURE: 97.2 F | BODY MASS INDEX: 29.72 KG/M2 | HEART RATE: 59 BPM | OXYGEN SATURATION: 99 % | WEIGHT: 167.8 LBS

## 2025-03-04 DIAGNOSIS — Z98.890 OTHER SPECIFIED POSTPROCEDURAL STATES: Chronic | ICD-10-CM

## 2025-03-04 DIAGNOSIS — C50.412 MALIGNANT NEOPLASM OF UPPER-OUTER QUADRANT OF LEFT FEMALE BREAST: ICD-10-CM

## 2025-03-05 ENCOUNTER — OUTPATIENT (OUTPATIENT)
Dept: OUTPATIENT SERVICES | Facility: HOSPITAL | Age: 70
LOS: 1 days | End: 2025-03-05

## 2025-03-05 DIAGNOSIS — C50.412 MALIGNANT NEOPLASM OF UPPER-OUTER QUADRANT OF LEFT FEMALE BREAST: ICD-10-CM

## 2025-03-05 DIAGNOSIS — Z98.890 OTHER SPECIFIED POSTPROCEDURAL STATES: Chronic | ICD-10-CM

## 2025-03-06 ENCOUNTER — OUTPATIENT (OUTPATIENT)
Dept: OUTPATIENT SERVICES | Facility: HOSPITAL | Age: 70
LOS: 1 days | End: 2025-03-06

## 2025-03-06 DIAGNOSIS — C50.412 MALIGNANT NEOPLASM OF UPPER-OUTER QUADRANT OF LEFT FEMALE BREAST: ICD-10-CM

## 2025-03-06 DIAGNOSIS — Z98.890 OTHER SPECIFIED POSTPROCEDURAL STATES: Chronic | ICD-10-CM

## 2025-03-07 ENCOUNTER — OUTPATIENT (OUTPATIENT)
Dept: OUTPATIENT SERVICES | Facility: HOSPITAL | Age: 70
LOS: 1 days | End: 2025-03-07

## 2025-03-07 DIAGNOSIS — Z98.890 OTHER SPECIFIED POSTPROCEDURAL STATES: Chronic | ICD-10-CM

## 2025-03-07 DIAGNOSIS — C50.412 MALIGNANT NEOPLASM OF UPPER-OUTER QUADRANT OF LEFT FEMALE BREAST: ICD-10-CM

## 2025-03-08 DIAGNOSIS — C50.412 MALIGNANT NEOPLASM OF UPPER-OUTER QUADRANT OF LEFT FEMALE BREAST: ICD-10-CM

## 2025-04-03 NOTE — ASU PATIENT PROFILE, ADULT - TOBACCO USE
[facility]  Cardiology Consultation        Maricruz Ventura, male  : 1941  Attending/Consulting Provider: Eliza Menendez MD   Primary Care Physician: Adan Costello MD     Reason for Consultation: .consult    SUBJECTIVE:     History of Present Illness:  Maricruz Ventura is a 84-year old male with past medical history of CAD with NSTEMI s/p GIOVANNA to RCA on 2021, hypertension, hyperlipidemia, CVA, PAF s/p loop recorder implantation on 2023, HFrEF, epilepsy, aortic insufficiency, and mitral valve regurgitation, who presented from assisted living facility in the setting of a fall.    Patient was referred from assisted living facility after a fall.  Mechanism/nature of fall unclear at this moment.  Patient has baseline dementia, and therefore does not remember this event.  He is not able to recall fall but denies any recollection of chest pain, shortness of breath, palpitation, dizziness, lightheadedness, double vision or blurry vision in the last couple days.    On arrival to the emergency department: /84, HR: 66, RR: 16.  WBC 23K.  Troponin: 7 lactic: 1.3.  EKG with sinus rhythm and T wave inversions in inferior leads, as noted before.    Cardiology consulted in the setting of possible syncope and for ILR interrogation      ROS:  10 systems reviewed and negative apart from that stated in HPI    Past Medical History:   Diagnosis Date    Acute non-ST elevation myocardial infarction (NSTEMI)  (CMD) 2021    3/2021- went to [facility]    Acute respiratory failure with hypoxia  (CMD) 10/02/2023    Aspiration pneumonia  (CMD) 10/02/2023    CAD (coronary artery disease) 2021    s/p RCA stent at [facility]    Chronic insomnia 2020    Chronic serous otitis media     CVA (cerebral vascular accident)  (CMD)     Deviated nasal septum     Epilepsy (CMD)     Hearing loss     HFrEF (heart failure with reduced ejection fraction)  (CMD)     HLD (hyperlipidemia)     HTN  (hypertension)     On amiodarone therapy 05/06/2021    PAF (paroxysmal atrial fibrillation)  (CMD)     Polycythemia vera (CMD)     Right thalamic stroke  (CMD) 06/22/2021    Seizures  (CMD)     Sepsis without acute organ dysfunction  (CMD) 10/02/2023       Past Surgical History:   Procedure Laterality Date    Cholecystectomy      Transurethral elec-surg prostatectom         Family History   Problem Relation Age of Onset    Depression Mother     Dementia/Alzheimers Father        Social History     Tobacco Use    Smoking status: Never    Smokeless tobacco: Never   Substance Use Topics    Alcohol use: Not Currently       ALLERGIES:   Allergen Reactions    Dust Other (See Comments)     Sneezing      Seasonal Runny Nose       Medications:    Prior to Admission medications    Medication Sig Start Date End Date Taking? Authorizing Provider   hydroxyUREA (HYDREA) 500 MG capsule Take 1 capsule by mouth 3 days a week. [facility] 4/2/25  Yes Eliza Menendez MD   pantoprazole (PROTONIX) 40 MG tablet Take 1 tablet by mouth. 4/2/25  Yes Eliza Menendez MD   warfarin (COUMADIN) 2.5 MG tablet Take by mouth as directed. As instructed by the Anticoagulation Clinic.  Take 3.75 mg (1.5 tab of 2.5 mg ) on Sun ,Tues,Thurs and Fri  Take 5 mg (2 tablet of 2.5 mg) on Mo,Wed ,Sat 4/2/25  Yes Eliza Menendez MD   levETIRAcetam (KepPRA) 500 MG tablet Take 1 tablet by mouth in the morning and 1 tablet in the evening. 3/4/25  Yes Thomas Raymond MD   memantine (NAMENDA) 5 MG tablet Take 1 tablet by mouth in the morning and 1 tablet in the evening. Start with 1 tab daily for 1 week; then if tolerated, increase to 1 in morning and 1 at night 3/4/25 6/2/25 Yes Thomas Raymond MD   ferrous sulfate 325 (65 FE) MG tablet Take 1 tablet by mouth every other day. 1/22/25  Yes Christine Shearer MD   metoPROLOL succinate (TOPROL-XL) 25 MG 24 hr tablet Take 0.5 tablets by mouth daily. 12/12/24  Yes Yahir Henderson MD   clopidogrel (PLAVIX) 75 MG tablet TAKE 1  TABLET BY MOUTH EVERY DAY 9/18/24  Yes Adan Costello MD   acetaminophen (TYLENOL) 325 MG tablet Take 325 mg by mouth every 6 hours as needed for Pain (mild pain). 7/18/24  Yes System, Provider Not In   sucralfate (CARAFATE) 1 g tablet TAKE 1 TABLET BY MOUTH EVERY DAY IN THE MORNING AND IN THE EVENING BEFORE MEALS 6/24/24  Yes Adan Costello MD   atorvastatin (LIPITOR) 40 MG tablet TAKE 1 TABLET BY MOUTH EVERY DAY 6/13/24  Yes Ariel Guerrero,    pantoprazole (PROTONIX) 40 MG tablet Take 1 tablet by mouth daily.  Patient taking differently: Take 40 mg by mouth in the morning and 40 mg in the evening. 3/17/25 4/2/25  Adan Costello MD   benzonatate (TESSALON PERLES) 200 MG capsule Take 1 capsule by mouth 3 times daily as needed for Cough. 2/1/25 4/2/25  Wayne Monsivais PA-C   permethrin (ACTICIN) 5 % cream PLEASE SEE ATTACHED FOR DETAILED DIRECTIONS 11/27/24 4/2/25  Provider, Outside   warfarin (COUMADIN) 2.5 MG tablet Take 1-1.5 tablets by mouth daily. As instructed by the Anticoagulation Clinic.  Patient taking differently: Take by mouth as directed. As instructed by the Anticoagulation Clinic.  Take 3.75 mg (1.5 tab of 2.5 mg ) on Sun ,Tues,Thurs and Fri  Take 5 mg (2 tablet of 2.5 mg) on Mo,Wed ,Sat 12/2/24 4/2/25  Yahir Henderson MD   warfarin (COUMADIN) 7.5 MG tablet Take 0.5-1 tablets by mouth daily. As directed by anticoagulation clinic.  Patient taking differently: Take 5 mg by mouth daily. As directed by anticoagulation clinic. 12/2/24 4/2/25  Yahir Henderson MD   zoster vaccine recomb adjuvanted (Shingrix) 50 MCG/0.5ML injection Inject 0.5 mLs into the muscle 1 time. Repeat dose in 2 to 6 months (unless 1 dose already given), for a total of 2 doses. 11/27/24 4/2/25  Anca Charlton PA-C   diphtheria-pertussis, acellular,-tetanus (Boostrix) injection Inject 0.5 mLs into the muscle 1 time. 11/27/24 4/2/25  Anca Charlton PA-C   hydroxyUREA (HYDREA) 500 MG capsule Take 1  capsule by mouth 3 days a week. Mon/Wed/Fri  Patient taking differently: Take 500 mg by mouth 3 days a week. [facility] 10/9/24 4/2/25  Christine Shearer MD       Current Facility-Administered Medications   Medication Dose Route Frequency Provider Last Rate Last Admin    sodium chloride 0.9 % injection 10 mL  10 mL Intravenous PRN Bobby Orellana DO        sodium chloride 0.9 % injection 2 mL  2 mL Intracatheter 2 times per day Bobby Orellana DO   2 mL at 04/02/25 2003    sodium chloride (NORMAL SALINE) 0.9 % bolus 500 mL  500 mL Intravenous PRN Bobby Orellana DO        acetaminophen (TYLENOL) tablet 650 mg  650 mg Oral Q4H PRN Bobby Orellana DO        Or    acetaminophen (TYLENOL) suppository 650 mg  650 mg Rectal Q4H PRN Bobby Orellana DO        Potassium Standard Replacement Protocol (Levels 3.5 and lower)   Does not apply See Admin Instructions Bobby Orellana DO        Magnesium Standard Replacement Protocol   Does not apply See Admin Instructions Bobby Orellana DO        clopidogrel (PLAVIX) tablet 75 mg  75 mg Oral Daily Eliza Menendez MD   75 mg at 04/02/25 1217    WARFARIN - PHARMACIST MONITORED Misc   Does not apply See Admin Instructions Eliza Menendez MD        hydroxyUREA (HYDREA) capsule 500 mg  500 mg Oral Once per day on Monday Wednesday Friday Eliza Menendez MD   500 mg at 04/02/25 1448    perflutren lipid microsphere (DEFINITY) injection 2 mL  2 mL Intravenous PRN Eliza Menendez MD        diphtheria-pertussis (acellular)-tetanus (BOOSTRIX) vaccine 0.5 mL  0.5 mL Intramuscular Once Irene Salter MD        atorvastatin (LIPITOR) tablet 40 mg  40 mg Oral Daily Bobby Orellana DO   40 mg at 04/02/25 0851    levETIRAcetam (KepPRA) tablet 500 mg  500 mg Oral BID Bobby Orellana DO   500 mg at 04/02/25 2003    memantine (NAMENDA) tablet 5 mg  5 mg Oral BID Bobby Orellana DO   5 mg at 04/02/25 2003    metoPROLOL succinate (TOPROL-XL) ER tablet 12.5 mg  12.5  mg Oral Daily Bobby Orellana A, DO   12.5 mg at 04/02/25 1215    pantoprazole (PROTONIX) EC tablet 40 mg  40 mg Oral Daily Bobby Orellana, DO   40 mg at 04/02/25 0851           OBJECTIVE:     Wt Readings from Last 3 Encounters:   04/02/25 65.3 kg (143 lb 15.4 oz)   03/12/25 65.6 kg (144 lb 11.7 oz)   03/04/25 65 kg (143 lb 4.8 oz)       Vital Last Value 24 Hour Range   Temperature 99 °F (37.2 °C) (04/02/25 1916) Temp  Min: 97.7 °F (36.5 °C)  Max: 99 °F (37.2 °C)   Pulse (!) 60 (04/02/25 1916) Pulse  Min: 60  Max: 74   Respiratory 18 (04/02/25 1916) Resp  Min: 12  Max: 21   Non-Invasive  Blood Pressure 124/68 (04/02/25 1916) BP  Min: 124/68  Max: 166/72   Pulse Oximetry 96 % (04/02/25 1916) SpO2  Min: 94 %  Max: 98 %   Arterial   Blood Pressure   No data recorded       Intake/Output Summary (Last 24 hours) at 4/2/2025 2013  Last data filed at 4/2/2025 1842  Gross per 24 hour   Intake 360 ml   Output 1100 ml   Net -740 ml               Physical Exam:  General: Alert, pleasant, NAD  Eyes: No icterus noted  ENT: Moist mucous membranes  Neck: No significant JVD  Respiratory: CTA b/l, auscultated posteriorly, no wheezing, rhonchi or rales  Cardiovascular: RRR, S1, S2, no S3, S4, no murmurs, rubs or gallops appreciated  Gastrointestinal: Soft, NT, ND, no rebound  Extrem: No edema in LE, pulses 2+ in UE and LE b/l  Skin: No cyanosis  Neuro: No focal deficitis appreciated, pt able to move all 4 extremities without issue  Psych: Normal afffect      DIAGNOSTIC STUDIES:     CBC:   Recent Labs   Lab 04/02/25  0446 04/01/25  2042 03/12/25  0945 01/22/25  1050   WBC 21.0* 23.0* 27.1* 28.2*   RBC 5.72 5.88 6.51* 6.35*   HGB 11.5* 11.7* 12.9* 12.4*   HCT 41.4 42.7 46.8 44.8   MCV 72.4* 72.6* 71.9* 70.6*   MCHC 27.8* 27.4* 27.6* 27.7*   RDW-CV 20.0* 20.1* 21.5* 20.5*    345 335 295   Lymphocytes, Percent 5 5 4 4     CMP:  Recent Labs   Lab 04/02/25  1159 04/02/25  0446 04/01/25  2042   SODIUM  --  141 140   POTASSIUM 3.9  3.5 4.3   CHLORIDE  --  107 107   CO2  --  28 28   BUN  --  17 21*   CREATININE  --  0.67 0.74   GLUCOSE  --  99 103*   MG  --  1.9  --      COAGULATION STUDIES:   Recent Labs   Lab 25   PT 16.6*   PTT 32   INR 1.6     TSH:    Lab Results   Component Value Date    TSH 1.207 2024    TSH 0.941 2014     HbA1c: Last Lab A1C:  No results found for: \"HGBA1C\"    Last Point of Care A1C:  No results found for: \"AJZHXLUQ7E\", \"5GLYH\"  LIPID PANEL: No results found  NT-PRONBP: No results for input(s): \"NTPROB\" in the last 8765 hours.  Troponin:   Recent Labs   Lab 25   HTROPI 7       Encounter Date: 25   Electrocardiogram 12-Lead   Result Value    Ventricular Rate EKG/Min (BPM) 60    Atrial Rate (BPM) 60    IL-Interval (MSEC) 148    QRS-Interval (MSEC) 92    QT-Interval (MSEC) 476    QTc 476    P Axis (Degrees) 22    R Axis (Degrees) -5    T Axis (Degrees) -53    REPORT TEXT      Normal sinus rhythm  Moderate voltage criteria for LVH, may be normal variant  (  R in aVL  ,  Sagar product  )  Inferior infarct  , age undetermined  Abnormal ECG  When compared with ECG of  27-SEP-2023 19:32,  Sinus rhythm  has replaced  Atrial fibrillation  Vent. rate  has decreased  BY  72 BPM  Inferior infarct  is now  present  T wave inversion now evident in  Inferior leads  Confirmed by LYLA MARINELLI, NAJMA ([id number]) on 2025 8:07:53 AM         Results for orders placed during the hospital encounter of 25    TRANSTHORACIC ECHO (TTE) COMPLETE W/ W/O IMAGING AGENT    Impression  *[facility]*  [address] [place] [address]  Phone [phone]  Fax [phone]  Transthoracic Echocardiogram (TTE)    Patient: Maricruz Ventura      Study Date/Time:     2025 4:06PM  MRN:     [id number]                   FIN#:                [id number]  :     1941                Ht/Wt:               170cm 65.3kg  Age:     84                        BSA/BMI:              1.76m^2 22.6kg/m^2  Gender:  M                         Baseline BP:         127 / 73  *Ordering Physician:* Eliza Menendez    *Referring Physician:* Eliza Menendez    *Attending Physician:* Eliza Menendez  *Performing Physician:* Romel Warren MD  *Diagnostic Physician:* Romel Warren MD  *Sonographer:* SAMI Can    ------------------------------------------------------------------------------  INDICATIONS:  Syncope.    ------------------------------------------------------------------------------  STUDY CONCLUSIONS  SUMMARY:    1. Left ventricle: The cavity size is normal. Wall thickness is severely  increased. There is severe asymmetric hypertrophy of the septum. Systolic  function is normal. The average 2D speckle global longitudinal strain is  normal. The global longitudinal strain value is -21.7%. The ejection  fraction was measured by 3D assessment. The ejection fraction is 60%.  2. Aortic valve: The annulus is mildly to moderately calcified. The valve is  probably trileaflet. The leaflets are mildly thickened and mildly  calcified. Cusp separation is mildly reduced. Velocity is within the normal  range. There is no stenosis. There is moderate regurgitation, with multiple  jets directed centrally in the LVOT, eccentrically in the LVOT, and towards  the mitral anterior leaflet. The peak systolic velocity is 1.7m/sec. The  regurgitation pressure half-time is 358ms.  3. Mitral valve: The annulus is mildly to moderately calcified. The leaflets  are mildly thickened and mildly calcified. Leaflet separation is mildly  reduced. Inflow velocity is minimally increased. The findings are  consistent with trivial stenosis. There is mild regurgitation. The mean  diastolic gradient is 3mm Hg.  4. Left atrium: The atrium is mildly dilated.  5. Right ventricle: The cavity size is normal. Systolic function is normal.  6. Pulmonic valve: There is trace regurgitation.  IMPRESSIONS:  Moderate AI. Mild  mitral stenosis is noted. Both have progressed  compared to the prior study.    ------------------------------------------------------------------------------  STUDY DATA:   Procedure:  A transthoracic echocardiogram was performed. Image  quality was good.  M-mode, complete 2D, 3D, complete spectral Doppler, color  Doppler, and strain imaging.  Study status:  Routine.  Study completion:  There were no complications.    FINDINGS    BASELINE ECG:   Normal sinus rhythm.  LEFT VENTRICLE:  3D imaging and post-processing assessment performed. The  cavity size is normal. Wall thickness is severely increased. There is severe  asymmetric hypertrophy of the septum. Systolic function is normal. The average  2D speckle global longitudinal strain is normal. The global longitudinal  strain value is -21.7%. Wall motion is normal; there are no regional wall  motion abnormalities. Unable to accurately assess left ventricular diastolic  function parameters due to technical limitations.    The ejection fraction was  measured by 3D assessment. The ejection fraction is 60%. The tissue Doppler  parameters are abnormal. Diastolic dysfunction present but unable to assess  severity.    AORTIC VALVE:  Poorly visualized. The annulus is mildly to moderately  calcified. The valve is probably trileaflet. The leaflets are mildly thickened  and mildly calcified. Cusp separation is mildly reduced. Velocity is within  the normal range. There is no stenosis. There is moderate regurgitation, with  multiple jets directed centrally in the LVOT, eccentrically in the LVOT, and  towards the mitral anterior leaflet. The peak systolic gradient is 12mm Hg.  The ratio of LVOT to aortic valve peak velocity is 0.77. The valve area is  1.6cm^2. The valve area index is 0.88cm^2/m^2.    AORTA:  Aortic root: The root is normal-sized.    MITRAL VALVE:  The annulus is mildly to moderately calcified. The leaflets are  mildly thickened and mildly calcified. Leaflet  separation is mildly reduced.  Inflow velocity is minimally increased. The findings are consistent with  trivial stenosis. There is mild regurgitation. The mean diastolic gradient is  3mm Hg. The valve area (LVOT continuity) is 1.1cm^2. The valve area index  (LVOT continuity) is 0.64cm^2/m^2.    ATRIAL SEPTUM:  Not well visualized. The septum is thickened.  Color doppler  shows no obvious shunt.  Color doppler shows no obvious shunt.    LEFT ATRIUM:  Well visualized. The atrium is mildly dilated.    RIGHT VENTRICLE:  The cavity size is normal. Systolic function is normal. The  TAPSE is normal, suggestive of normal RV systolic function.  Systolic pressure  is within the normal range. The estimated peak pressure is 30mm Hg.       The  RV pressure during systole is 30mm Hg.    VENTRICULAR SEPTUM:   Thickness is increased.  There is no evidence of a  ventricular septal defect.    PULMONIC VALVE:   Not well visualized. There is trace regurgitation. The peak  systolic gradient is 5mm Hg.    TRICUSPID VALVE:  Not well visualized. Leaflet separation is normal. There is  trivial regurgitation.    RIGHT ATRIUM:  Well visualized. The atrium is normal in size.       The  estimated central venous pressure is 3mm Hg.    PERICARDIUM:   There is no pericardial effusion.    SYSTEMIC VEINS:  Inferior vena cava: The IVC is normal-sized.  Respirophasic diameter changes  are in the normal range (>= 50%).    ------------------------------------------------------------------------------  Measurements    Left ventricle             Value           Ref       04/20/2024  Aortic valve               Value          Ref       04/20/2024  GLS, 3P                    -21.70 %        --------- ----------  Peak v, S                  1.7   m/sec    --------- 1.9  CRIS, LAX chord         (L) 3.8    cm       4.2 - 5.8 3.7         Peak grad, S               12    mm Hg    --------- 14  CRIS/bsa, LAX chord     (N) 2.2    cm/m^2   2.2 - 3.0 2.3          LVOT/AV, Vpeak ratio       0.77           --------- 0.69  PW, ED, LAX            (H) 1.1    cm       0.6 - 1.0 0.8         CHRISTINE, Vmax                  1.6   cm^2     --------- 1.7  IVS, ED                (H) 1.4    cm       0.6 - 1.0 0.8         CHIRSTINE/bsa, Vmax              0.88  cm^2/m^2 --------- 1.09  PW, ED                 (H) 1.1    cm       0.6 - 1.0 0.8         AR PHT                     358   ms       --------- 314  IVS/PW, ED                 1.22            --------- 1.02  EDV                    (L) 56     ml       62 - 150  50          Mitral valve               Value          Ref       04/20/2024  ESV                    (L) 15     ml       21 - 61   14          Mean v, D                  0.79  m/sec    --------- ----------  EF                     (N) 60     %        52 - 72   65          VTI leaflet coapt          56.1  cm       --------- ----------  SV                         41     ml       --------- 38          MiV/LVOT VTI               1.8            --------- ----------  EDV/bsa                (L) 32     ml/m^2   34 - 74   31          Mean grad, D               3     mm Hg    --------- ----------  ESV/bsa                (L) 9      ml/m^2   11 - 31   9           A-VTI                      60.0  cm       --------- ----------  SV/bsa                     23     ml/m^2   --------- 24          MVA, LVOT cont             1.1   cm^2     --------- ----------  E', lat dinah, TDI       (L) 6.0    cm/sec   >=10.0    8.2         MVA/bsa, LVOT cont         0.64  cm^2/m^2 --------- ----------  E', med dinah, TDI       (L) 6.0    cm/sec   >=7.0     4.7  E', avg, TDI               5.98   cm/sec   --------- 6.42        Pulmonic valve             Value          Ref       04/20/2024  Peak v, S                  1.1   m/sec    --------- ----------  LVOT                       Value           Ref       04/20/2024  Peak grad, S               5     mm Hg    --------- ----------  Diam, S                    1.6    cm       ---------  1.8         CA v, ED                   0.88  m/sec    --------- ----------  Area                       2.0    cm^2     --------- 2.5  Peak stephen, S                1.32   m/sec    --------- 1.29        Tricuspid valve            Value          Ref       04/20/2024  Peak grad, S               7      mm Hg    --------- 7           TR peak v              (N) 2.6   m/sec    <=2.8     2.6  Peak RV-RA grad, S         27    mm Hg    --------- 26  Right ventricle            Value           Ref       04/20/2024  CRIS minor ax, A4C base (N) 3.7    cm       2.5 - 4.1 ----------  Aortic root                Value          Ref       04/20/2024  TAPSE, MM              (N) 2.0    cm       >=1.7     2.0         S-T junct diam, ED     (N) 2.4   cm       2.3 - 3.5 2.6  Pressure, S                30     mm Hg    --------- 29          S-T junct diam/bsa, ED (N) 1.3   cm/m^2   1.1 - 1.9 1.6    Left atrium                Value           Ref       04/20/2024  Ascending aorta            Value          Ref       04/20/2024  AP dim, ES             (H) 4.4    cm       3.0 - 4.0 3.2         AAo AP diam, ED        (N) 3.4   cm       2.2 - 3.8 2.9  AP dim index, ES       (H) 2.5    cm/m^2   1.5 - 2.3 2.0         AAo AP diam/bsa, ED    (N) 1.9   cm/m^2   1.1 - 1.9 1.8  Area ES, A4C           (H) 22     cm^2     <=20      24  Area/bsa ES, A4C           12.4   cm^2/m^2 --------- 15.04       Pulmonary artery           Value          Ref       04/20/2024  Area ES, A2C               21     cm^2     --------- 18          Pressure, S                25    mm Hg    --------- ----------  Area/bsa ES, A2C           12.11  cm^2/m^2 --------- 11.47       Pressure ED                6     mm Hg    --------- ----------  SI dim, A2C                6.4    cm       --------- ----------  Vol, ES, 1-p A4C       (H) 61     ml       18 - 58   70          Systemic veins             Value          Ref       04/20/2024  Vol/bsa, ES, 1-p A4C   (N) 35     ml/m^2   12 - 37   44           Estimated CVP              3     mm Hg    --------- 3  Vol, ES, 1-p A2C       (H) 59     ml       18 - 58   52  Vol/bsa, ES, 1-p A2C   (N) 34     ml/m^2   11 - 43   32  Vol, ES, 2-p               62     ml       --------- ----------  Vol/bsa, ES, 2-p       (H) 35     ml/m^2   16 - 34   ----------  Legend:  (L)  and  (H)  matt values outside specified reference range.    (N)  marks values inside specified reference range.    Prepared and electronically signed by  Matt Warren MD  04/02/2025 17:22            ASSESSMENT AND PLAN:     Impression: Maricruz Ventura is a 84-year old male with past medical history of CAD with NSTEMI s/p GIOVANNA to RCA on 2/2021, hypertension, hyperlipidemia, CVA, PAF s/p loop recorder implantation on 2/14/2023, HFrEF, epilepsy, aortic insufficiency, and mitral valve regurgitation, who presented from assisted living facility in the setting of a fall.    Cardiology consulted in the setting of fall for ILR device check.    Diagnoses:   # Fall  #Paroxysmal atrial fibrillation s/p l ILR (2/14/2023)  -Nature of fall unclear as patient does not remember this event.  ILR interrogation today with no episodes of ventricular rhythm.  Stable findings of 0.6% of A-fib and 0.6% of PVCs.  No documentation of sustained ventricular rhythms.    # Heart failure with recovered ejection fraction  #Moderate aortic sufficiency  -TTE: LVEF 60%.  Asymmetric hypertrophy of septum.  Aortic valve with moderate insufficiency.  There is mild mitral regurgitation and trivial stenosis.  Normal RV systolic function.    # CAD s/p test to RCA (2/2021)  -Continue Plavix 75 mg daily and atorvastatin 40 mg daily    # History of CVA  -Continue warfarin as well as Plavix 75 mg daily    #Hypertension  # Hyperlipidemia    Recommendations:  -Unclear nature of fall.  Patient does not remember episode.  ILR interrogation today (4/2/2025) reassuring.  No further cardiac workup needed during this admission-   -Continue Plavix 75  mg daily  -Continue atorvastatin 40 mg daily  -Continue metoprolol succinate 12.5 mg daily    Case discussed with Dr. Beatriz Means MD  Cardiology Fellow, PGY-4  General Cardiology consult service  4/2/20258:13 PM   ---------------------------  Maricruz is ready for discharge from my viewpoint: Yes  Workup needed prior to discharge: None   Medications changes at discharge:  None   Follow up appointments needed after discharge:   General cardiology     Estimated Date of Discharge documented: 4/3/2025    Former smoker

## 2025-04-09 ENCOUNTER — OUTPATIENT (OUTPATIENT)
Dept: OUTPATIENT SERVICES | Facility: HOSPITAL | Age: 70
LOS: 1 days | End: 2025-04-09
Payer: MEDICARE

## 2025-04-09 ENCOUNTER — APPOINTMENT (OUTPATIENT)
Dept: RADIATION ONCOLOGY | Facility: HOSPITAL | Age: 70
End: 2025-04-09
Payer: MEDICARE

## 2025-04-09 VITALS
DIASTOLIC BLOOD PRESSURE: 84 MMHG | SYSTOLIC BLOOD PRESSURE: 146 MMHG | RESPIRATION RATE: 14 BRPM | WEIGHT: 168 LBS | TEMPERATURE: 97.9 F | OXYGEN SATURATION: 97 % | HEART RATE: 66 BPM | BODY MASS INDEX: 29.76 KG/M2

## 2025-04-09 DIAGNOSIS — Z98.890 OTHER SPECIFIED POSTPROCEDURAL STATES: Chronic | ICD-10-CM

## 2025-04-09 DIAGNOSIS — C50.412 MALIGNANT NEOPLASM OF UPPER-OUTER QUADRANT OF LEFT FEMALE BREAST: ICD-10-CM

## 2025-04-09 DIAGNOSIS — Z92.3 PERSONAL HISTORY OF IRRADIATION: ICD-10-CM

## 2025-04-09 PROCEDURE — 99024 POSTOP FOLLOW-UP VISIT: CPT

## 2025-04-23 DIAGNOSIS — C50.412 MALIGNANT NEOPLASM OF UPPER-OUTER QUADRANT OF LEFT FEMALE BREAST: ICD-10-CM

## 2025-05-06 ENCOUNTER — APPOINTMENT (OUTPATIENT)
Dept: UROLOGY | Facility: CLINIC | Age: 70
End: 2025-05-06
Payer: MEDICARE

## 2025-05-06 VITALS
RESPIRATION RATE: 18 BRPM | DIASTOLIC BLOOD PRESSURE: 83 MMHG | BODY MASS INDEX: 28.35 KG/M2 | HEIGHT: 63 IN | SYSTOLIC BLOOD PRESSURE: 138 MMHG | WEIGHT: 160 LBS | OXYGEN SATURATION: 96 % | HEART RATE: 72 BPM

## 2025-05-06 DIAGNOSIS — R31.29 OTHER MICROSCOPIC HEMATURIA: ICD-10-CM

## 2025-05-06 DIAGNOSIS — N20.0 CALCULUS OF KIDNEY: ICD-10-CM

## 2025-05-06 DIAGNOSIS — Z92.3 PERSONAL HISTORY OF IRRADIATION: ICD-10-CM

## 2025-05-06 PROCEDURE — G2211 COMPLEX E/M VISIT ADD ON: CPT

## 2025-05-06 PROCEDURE — 99214 OFFICE O/P EST MOD 30 MIN: CPT

## 2025-06-30 ENCOUNTER — APPOINTMENT (OUTPATIENT)
Dept: OBGYN | Facility: CLINIC | Age: 70
End: 2025-06-30
Payer: MEDICARE

## 2025-06-30 VITALS
WEIGHT: 163 LBS | BODY MASS INDEX: 28.88 KG/M2 | HEIGHT: 63 IN | HEART RATE: 80 BPM | DIASTOLIC BLOOD PRESSURE: 82 MMHG | SYSTOLIC BLOOD PRESSURE: 137 MMHG

## 2025-06-30 LAB
BILIRUB UR QL STRIP: NORMAL
CLARITY UR: CLEAR
COLLECTION METHOD: NORMAL
GLUCOSE UR-MCNC: NORMAL
HCG UR QL: 0.2 EU/DL
HGB UR QL STRIP.AUTO: ABNORMAL
KETONES UR-MCNC: NORMAL
LEUKOCYTE ESTERASE UR QL STRIP: NORMAL
NITRITE UR QL STRIP: NORMAL
PH UR STRIP: 5.5
PROT UR STRIP-MCNC: NORMAL
SP GR UR STRIP: 1.01

## 2025-06-30 PROCEDURE — 99459 PELVIC EXAMINATION: CPT

## 2025-06-30 PROCEDURE — 99213 OFFICE O/P EST LOW 20 MIN: CPT | Mod: 25

## 2025-06-30 PROCEDURE — 81003 URINALYSIS AUTO W/O SCOPE: CPT | Mod: QW

## 2025-07-06 LAB — BACTERIA UR CULT: NORMAL

## 2025-07-09 ENCOUNTER — RESULT REVIEW (OUTPATIENT)
Age: 70
End: 2025-07-09

## 2025-07-09 ENCOUNTER — OUTPATIENT (OUTPATIENT)
Dept: OUTPATIENT SERVICES | Facility: HOSPITAL | Age: 70
LOS: 1 days | End: 2025-07-09
Payer: MEDICARE

## 2025-07-09 DIAGNOSIS — Z98.890 OTHER SPECIFIED POSTPROCEDURAL STATES: Chronic | ICD-10-CM

## 2025-07-09 DIAGNOSIS — C50.412 MALIGNANT NEOPLASM OF UPPER-OUTER QUADRANT OF LEFT FEMALE BREAST: ICD-10-CM

## 2025-07-09 PROCEDURE — G0279: CPT

## 2025-07-09 PROCEDURE — 76642 ULTRASOUND BREAST LIMITED: CPT | Mod: LT

## 2025-07-09 PROCEDURE — G0279: CPT | Mod: 26

## 2025-07-09 PROCEDURE — 76642 ULTRASOUND BREAST LIMITED: CPT | Mod: 26,LT

## 2025-07-09 PROCEDURE — 77065 DX MAMMO INCL CAD UNI: CPT | Mod: LT

## 2025-07-09 PROCEDURE — 77065 DX MAMMO INCL CAD UNI: CPT | Mod: 26,LT

## 2025-07-10 DIAGNOSIS — C50.412 MALIGNANT NEOPLASM OF UPPER-OUTER QUADRANT OF LEFT FEMALE BREAST: ICD-10-CM

## 2025-08-13 ENCOUNTER — APPOINTMENT (OUTPATIENT)
Age: 70
End: 2025-08-13

## 2025-08-13 VITALS
RESPIRATION RATE: 19 BRPM | OXYGEN SATURATION: 99 % | HEART RATE: 71 BPM | DIASTOLIC BLOOD PRESSURE: 83 MMHG | HEIGHT: 63 IN | TEMPERATURE: 98 F | BODY MASS INDEX: 29.59 KG/M2 | SYSTOLIC BLOOD PRESSURE: 136 MMHG | WEIGHT: 167 LBS

## 2025-08-13 DIAGNOSIS — Z51.81 ENCOUNTER FOR THERAPEUTIC DRUG LVL MONITORING: ICD-10-CM

## 2025-08-13 DIAGNOSIS — Z79.811 ENCOUNTER FOR THERAPEUTIC DRUG LVL MONITORING: ICD-10-CM

## 2025-08-13 PROCEDURE — 99214 OFFICE O/P EST MOD 30 MIN: CPT

## 2025-08-14 ENCOUNTER — APPOINTMENT (OUTPATIENT)
Dept: BREAST CENTER | Facility: CLINIC | Age: 70
End: 2025-08-14

## 2025-08-14 ENCOUNTER — NON-APPOINTMENT (OUTPATIENT)
Age: 70
End: 2025-08-14

## 2025-08-14 VITALS
HEIGHT: 63 IN | WEIGHT: 167 LBS | HEART RATE: 69 BPM | BODY MASS INDEX: 29.59 KG/M2 | SYSTOLIC BLOOD PRESSURE: 136 MMHG | DIASTOLIC BLOOD PRESSURE: 90 MMHG

## 2025-08-14 DIAGNOSIS — C50.412 MALIGNANT NEOPLASM OF UPPER-OUTER QUADRANT OF LEFT FEMALE BREAST: ICD-10-CM

## 2025-08-14 DIAGNOSIS — D05.12 INTRADUCTAL CARCINOMA IN SITU OF LEFT BREAST: ICD-10-CM

## 2025-08-14 DIAGNOSIS — Z98.890 OTHER SPECIFIED POSTPROCEDURAL STATES: ICD-10-CM

## 2025-08-14 DIAGNOSIS — R92.30 DENSE BREASTS, UNSPECIFIED: ICD-10-CM

## 2025-08-14 PROCEDURE — 99215 OFFICE O/P EST HI 40 MIN: CPT

## 2025-08-14 PROCEDURE — G2211 COMPLEX E/M VISIT ADD ON: CPT
